# Patient Record
Sex: MALE | Race: WHITE
[De-identification: names, ages, dates, MRNs, and addresses within clinical notes are randomized per-mention and may not be internally consistent; named-entity substitution may affect disease eponyms.]

---

## 2018-06-12 ENCOUNTER — HOSPITAL ENCOUNTER (OUTPATIENT)
Dept: HOSPITAL 96 - M.NUC | Age: 73
End: 2018-06-12
Attending: INTERNAL MEDICINE
Payer: MEDICARE

## 2018-06-12 DIAGNOSIS — I63.9: ICD-10-CM

## 2018-06-12 DIAGNOSIS — I25.10: ICD-10-CM

## 2018-06-12 DIAGNOSIS — E78.2: Primary | ICD-10-CM

## 2018-06-12 NOTE — [place] [address]
Phone:  [phone]                     CARDIAC NUCLEAR IMAGING REPORT
_______________________________________________________________________________
 
Name:         MIRANDA JOLLY JR        Room:                     REG CLI
M.R.#:    [id number]     Account #:     [id number]  
Admission:    18    Attend Phys:   Román Lucas,
Discharge:                Date of Birth: [birth year]  
Date of Service: 18 1525  Report #:      [id number]
        [id number] 
_______________________________________________________________________________
THIS REPORT FOR:  //name//                      
 
 
--------------- APPROVED REPORT --------------
 
 
Study performed:  2018 08:15:00
 
Indication: mixed hyperlipidemia, cerebral infarct
Patient Location: Out-Patient
Stress Tech: Niecy Jimenez
Stress Nurse: Nanette Cruz RN
NM Tech:DESIREE Castaneda
 
Ht: 5 ft 10 in  Wt: 182 lbs  BSA:  2.01 m2
    BMI:  26.11
 
Medical History
Medical History: Stroke/TIA, HTN, Hyperlipidemia, Current Smoker, 
Carotid artery disease
Medications: amlodipine, atorvastatin, asa, lisinopril, 
plavix
Allergies: No known drug allergies
Cardiac Risk Factors: Age, Current Smoker, HTN, Hyperlipidemia, 
PVD
Exercise History: Sedentary
Meds Held (24 hrs): -
 
Resting Data
Rest SPECT myocardial perfusion imaging was performed in supine 
position 30 minutes following the intravenous injection of 10.5 mCi 
of Tc-99m Sestamibi.
Time of rest injection: 920     Date: 2018
Time of rest imagin     
The images were gated to evaluate regional wall motion and calculate 
left ventricular ejection fraction. 
Administration Route: IV
Administration Site: Left Wrist
 
Pharmacologic Stress
Pharmacologic stress test was performed by injecting Regadenoson 0.4 
mg IV push over 10-15 seconds immediately followed by the intravenous 
injection of 32.9 mCi of Tc-99m Sestamibi.
Time of stress injection: 1045     
Time of stress imagin     
Administration Route: [place]
 
 
[place] [address]
Phone:  [phone]                     CARDIAC NUCLEAR IMAGING REPORT
_______________________________________________________________________________
 
Name:         MIRANDA JOLLY JR        Room:                     REG CLI
MRipRip#:    [id number]     Account #:     [id number]  
Admission:    18    Attend Phys:   Román Lucas,
Discharge:                Date of Birth: [birth year]  
Date of Service: 18 1525  Report #:      [id number]
        [id number] 
_______________________________________________________________________________
Gated Stress SPECT was performed 40 minutes after stress 
injection.
The images were gated to evaluate regional wall motion and calculate 
left ventricular ejection fraction. 
Prone imaging was performed.
 
Stress Test Details
Stress Test:  Pharmacologic stress testing performed using 0.4 mg of 
regadenoson per 5 mL given IV over 10 seconds.      
  Reason for pharmacologic stress test: physical 
limitation.      
HR           
Resting HR:            71 bpm   Max Heart Rate (APMHR): 147 bpm  
Max HR Achieved:  88 bpm   Target HR (85% APMHR): 124 bpm  
% of APMHR:         59         
Recovery HR:            98 bpm        
 
BP           
Resting BP:  157/88 mmHg        
Max BP:       168/56 mmHg        
 
ECG           
Resting ECG:  Sinus Rhythm        
Stress ECG:     Sinus Rhythm        
ST Change: None          
Arrhythmia:    None         
Recovery ECG: Sinus Rhythm        
Recovery ST Change: None        
Recovery Arrhythmia: None        
 
Clinical
Reason for Termination: Completed protocol
Stress Symptoms: None
Exercise duration: 0 min  sec
Exercise capacity: 1.0 METs
The patient tolerated Lexiscan infusion without significant 
symptoms.
 
Stress ECG Conclusion
Baseline 12-lead electrocardiogram showed sinus rhythm without 
significant ST segment abnormality. EKGs obtained during Lexiscan 
stress show sinus rhythm without significant ST segment changes when 
compared to baseline. There were no stress-induced 
arrhythmias.
 
Study Quality
 
 
[place] [address]
Phone:  [phone]                     CARDIAC NUCLEAR IMAGING REPORT
_______________________________________________________________________________
 
Name:         MIRANDA JOLLY JR        Room:                     REG CLI
M.R.#:    [id number]     Account #:     [id number]  
Admission:    18    Attend Phys:   Román Lucas,
Discharge:                Date of Birth: [birth year]  
Date of Service: 18 1525  Report #:      [id number]
        [id number] 
_______________________________________________________________________________
Study: Good
Artifact: Mild Diaphragmatic artifact
 
Study Data
At rest, the left ventricular ejection fraction was 51%..   
Post stress, the left ventricular ejection was 54%..   
TID = 0.91.       
 
Perfusion
Perfusion images obtained in the supine position show a moderate 
region of photopenia involving the inferior wall consistent with 
diaphragmatic attenuation artifact. There is also a focal apical 
defect consistent with apical thinning artifact. Post stress prone 
imaging shows relatively uniform uptake of the radioisotope 
throughout the myocardium with exception in the basal portion of the 
inferior wall.
 
Wall Motion
There appears to be very mild hypokinesis involving the basal portion 
of the inferior and inferoseptal wall. Remaining walls move 
normally.
 
Nuclear Conclusion
ECG Findings: negative for ischemia 
Clinical Findings: negative for ischemia 
Nuclear Findings: negative for ischemia 
Exercise Capacity: not assessed
Left Ventricular Function: abnormal 
Risk Study: low
Myocardial perfusion images suggest the possibility of a prior focal 
basal inferior wall infarct. There are no reversible defects to 
suggest ischemia. Left ventricular systolic function is fairly 
well-preserved. This is not a high risk study. 
 
&lt;Conclusion&gt;
Baseline 12-lead electrocardiogram showed sinus rhythm without 
significant ST segment abnormality. EKGs obtained during Lexiscan 
stress show sinus rhythm without significant ST segment changes when 
compared to baseline. There were no stress-induced arrhythmias.
 
 
 
 
 
  <ELECTRONICALLY SIGNED>
                                           By: Román Lucas MD, FACC   
  18     1525
D: 18 1525   _____________________________________
T: 18 1525   Román Lucas MD, FACC     /INF